# Patient Record
Sex: MALE | Race: WHITE | NOT HISPANIC OR LATINO
[De-identification: names, ages, dates, MRNs, and addresses within clinical notes are randomized per-mention and may not be internally consistent; named-entity substitution may affect disease eponyms.]

---

## 2019-03-11 PROBLEM — Z00.00 ENCOUNTER FOR PREVENTIVE HEALTH EXAMINATION: Status: ACTIVE | Noted: 2019-03-11

## 2019-03-15 ENCOUNTER — RECORD ABSTRACTING (OUTPATIENT)
Age: 54
End: 2019-03-15

## 2019-03-15 DIAGNOSIS — Z82.49 FAMILY HISTORY OF ISCHEMIC HEART DISEASE AND OTHER DISEASES OF THE CIRCULATORY SYSTEM: ICD-10-CM

## 2019-03-15 DIAGNOSIS — M79.9 SOFT TISSUE DISORDER, UNSPECIFIED: ICD-10-CM

## 2019-03-15 DIAGNOSIS — D35.02 BENIGN NEOPLASM OF LEFT ADRENAL GLAND: ICD-10-CM

## 2019-03-15 DIAGNOSIS — Z85.850 PERSONAL HISTORY OF MALIGNANT NEOPLASM OF THYROID: ICD-10-CM

## 2019-03-15 DIAGNOSIS — Z78.9 OTHER SPECIFIED HEALTH STATUS: ICD-10-CM

## 2019-03-15 DIAGNOSIS — Z98.890 OTHER SPECIFIED POSTPROCEDURAL STATES: ICD-10-CM

## 2019-03-15 DIAGNOSIS — M43.6 TORTICOLLIS: ICD-10-CM

## 2019-03-15 DIAGNOSIS — Z80.0 FAMILY HISTORY OF MALIGNANT NEOPLASM OF DIGESTIVE ORGANS: ICD-10-CM

## 2019-03-15 RX ORDER — OMEGA-3S/DHA/EPA/FISH OIL 980-1400MG
CAPSULE,DELAYED RELEASE (ENTERIC COATED) ORAL
Refills: 0 | Status: ACTIVE | COMMUNITY

## 2019-03-15 RX ORDER — LEVOTHYROXINE SODIUM 0.12 MG/1
125 TABLET ORAL DAILY
Refills: 0 | Status: ACTIVE | COMMUNITY

## 2019-03-15 RX ORDER — CHLORHEXIDINE GLUCONATE 4 %
LIQUID (ML) TOPICAL
Refills: 0 | Status: ACTIVE | COMMUNITY

## 2019-04-01 ENCOUNTER — APPOINTMENT (OUTPATIENT)
Dept: OTOLARYNGOLOGY | Facility: CLINIC | Age: 54
End: 2019-04-01
Payer: COMMERCIAL

## 2019-04-01 DIAGNOSIS — Z86.39 PERSONAL HISTORY OF OTHER ENDOCRINE, NUTRITIONAL AND METABOLIC DISEASE: ICD-10-CM

## 2019-04-01 PROCEDURE — 99213 OFFICE O/P EST LOW 20 MIN: CPT | Mod: 25

## 2019-04-01 PROCEDURE — 31575 DIAGNOSTIC LARYNGOSCOPY: CPT

## 2019-04-01 NOTE — CONSULT LETTER
[Dear  ___] : Dear  [unfilled], [Consult Letter:] : I had the pleasure of evaluating your patient, [unfilled]. [Please see my note below.] : Please see my note below. [FreeTextEntry3] : Josh Braga MD [DrMary  ___] : Dr. ARGUETA

## 2019-04-01 NOTE — ASSESSMENT
[FreeTextEntry1] : Clinically is quite stable without any evidence of disease. He is followed an endocrinology care by Dr. Eduardo Patel.\par His airway is fine.\par His throat symptoms are likely from inflammation from the radioactive iodine. I would have expected him to have improved by this point. He does have a known history of GERD. I will empirically treat him with omeprazole 40 mg for LP are and semen follow up in 6 weeks time or as needed. I did reassure him that everything otherwise looked quite well

## 2019-04-01 NOTE — PROCEDURE
[de-identified] : PROCEDURE: Flexible laryngoscopy\par SURGEON: Dr. Braga\par INDICATIONS: He was unable to tolerate a mirror exam. Assess for laryngopharynx pharyngeal reflux. cough. head and neck mass. \par ANESTHESIA: The patient was placed in a sitting position.  Following application of the topical anesthetic and decongestant, exam was performed with a flexible scope.  The scope was passed along the right nasal floor to the nasopharynx.  It was then passed into the region of the middle meatus, middle turbinate, and sphenoethmoid region.  An identical procedure was performed on the left side.  The following findings were noted:\par \par The nasal musoca was healthy appearing and the septum was roughly midline. The middle meatus and sphenoethmoid recesses were clear bilaterally. The nasopharynx \par \par Nasopharynx: no masses, choanae patent, no adenoid tissue\par \par Base of tongue/vallecula: no masses or asymmetry\par Pharyngeal walls: symmetrical. No masses\par Pyriform sinuses: no lesions or pooling of secretions\par Epiglottis: normal. No edema or lesions\par Aryepiglottic folds: normal. No lesions. \par Vocal cords: clear and mobile. No lesions. Airway patent\par Arytenoids: no edema or erythema \par Interarytenoid area: no edema, erythema or lesion.\par \par

## 2019-04-01 NOTE — HISTORY OF PRESENT ILLNESS
[de-identified] : This gentleman is very well known to me. He is now approximately 5 months status post total thyroidectomy and left lateral neck dissection for stage I papillary thyroid cancer. He underwent radioactive iodine ablation in mid February. Also at my suggestion he has been getting physical therapy for some of the neck musculature.\par \par He reports to me that since the radioactive iodine he has had a feeling of tightness in his throat. He has slight intermittent dysphagia. He also notes slight change in voice since the iodine. He does not complain of unexplained weight loss or hemoptysis. He breathes comfortably.

## 2019-04-02 ENCOUNTER — TRANSCRIPTION ENCOUNTER (OUTPATIENT)
Age: 54
End: 2019-04-02

## 2019-05-02 ENCOUNTER — APPOINTMENT (OUTPATIENT)
Dept: OTOLARYNGOLOGY | Facility: CLINIC | Age: 54
End: 2019-05-02
Payer: COMMERCIAL

## 2019-05-02 DIAGNOSIS — K21.9 GASTRO-ESOPHAGEAL REFLUX DISEASE W/OUT ESOPHAGITIS: ICD-10-CM

## 2019-05-02 PROCEDURE — 99213 OFFICE O/P EST LOW 20 MIN: CPT | Mod: 25

## 2019-05-02 PROCEDURE — 31575 DIAGNOSTIC LARYNGOSCOPY: CPT

## 2019-05-02 RX ORDER — METOCLOPRAMIDE 5 MG/1
5 TABLET ORAL
Refills: 0 | Status: DISCONTINUED | COMMUNITY
End: 2019-05-02

## 2019-05-02 RX ORDER — LEVOTHYROXINE SODIUM 0.17 MG/1
TABLET ORAL
Refills: 0 | Status: DISCONTINUED | COMMUNITY
End: 2019-05-02

## 2019-05-02 RX ORDER — ALPRAZOLAM 1 MG/1
1 TABLET ORAL
Refills: 0 | Status: DISCONTINUED | COMMUNITY
End: 2019-05-02

## 2019-05-02 NOTE — ASSESSMENT
[FreeTextEntry1] : Clinically improving. I've suggested she take his dose of omeprazole before lunch not before dinner. He will also take Zantac 300 mg once or twice a day.\par \par I have emphasized importance of diet and lifestyle modifications.\par I have asked him to followup with me in 2 months time as needed. I have reassured him at this time there is no evidence of disease recurrence of tumor.

## 2019-05-02 NOTE — HISTORY OF PRESENT ILLNESS
[de-identified] : This gentleman is very well known to me. He is now approximately 5 months status post total thyroidectomy and left lateral neck dissection for stage I papillary thyroid cancer. He underwent radioactive iodine ablation in mid February. Also at my suggestion he has been getting physical therapy for some of the neck musculature.\par \par He reports to me that since the radioactive iodine he has had a feeling of tightness in his throat. He has slight intermittent dysphagia. He also notes slight change in voice since the iodine. He does not complain of unexplained weight loss or hemoptysis. He breathes comfortably. [FreeTextEntry1] : This gentleman is very well known to me. He is 6 much status post surgical treatment for region the metastatic papillary thyroid cancer.\par He did receive radioactive iodine ablation.\par \par He is also getting physical therapy for some neck discomfort.\par \par He had some throat pain symptoms. I felt that it was likely related to LP R. as he had a known history of GERD.\par I treated him with once a day omeprazole.\par He reports that on some days he is feeling better and others he is not.

## 2019-05-02 NOTE — CONSULT LETTER
[Dear  ___] : Dear  [unfilled], [Sincerely,] : Sincerely, [Courtesy Letter:] : I had the pleasure of seeing your patient, [unfilled], in my office today. [Please see my note below.] : Please see my note below. [FreeTextEntry3] : Josh Braga MD

## 2019-05-02 NOTE — PROCEDURE
[de-identified] : PROCEDURE: Flexible laryngoscopy\par SURGEON: Dr. Braga\par INDICATIONS: He was unable to tolerate a mirror exam. Assess for laryngopharynx pharyngeal reflux. cough. head and neck mass. \par ANESTHESIA: The patient was placed in a sitting position.  Following application of the topical anesthetic and decongestant, exam was performed with a flexible scope.  The scope was passed along the right nasal floor to the nasopharynx.  It was then passed into the region of the middle meatus, middle turbinate, and sphenoethmoid region.  An identical procedure was performed on the left side.  The following findings were noted:\par \par The nasal musoca was healthy appearing and the septum was roughly midline. The middle meatus and sphenoethmoid recesses were clear bilaterally. The nasopharynx \par \par Nasopharynx: no masses, choanae patent, no adenoid tissue\par \par Base of tongue/vallecula: no masses or asymmetry\par Pharyngeal walls: symmetrical. No masses\par Pyriform sinuses: no lesions or pooling of secretions\par Epiglottis: normal. No edema or lesions\par Aryepiglottic folds: normal. No lesions. \par Vocal cords: clear and mobile. No lesions. Airway patent\par Arytenoids: no edema or erythema \par Interarytenoid area: no edema, erythema or lesion.\par \par

## 2019-05-31 ENCOUNTER — APPOINTMENT (OUTPATIENT)
Dept: OTOLARYNGOLOGY | Facility: CLINIC | Age: 54
End: 2019-05-31

## 2019-07-09 ENCOUNTER — APPOINTMENT (OUTPATIENT)
Dept: OTOLARYNGOLOGY | Facility: CLINIC | Age: 54
End: 2019-07-09
Payer: COMMERCIAL

## 2019-07-09 DIAGNOSIS — M62.89 OTHER SPECIFIED DISORDERS OF MUSCLE: ICD-10-CM

## 2019-07-09 PROCEDURE — 99213 OFFICE O/P EST LOW 20 MIN: CPT | Mod: 25

## 2019-07-09 PROCEDURE — 31575 DIAGNOSTIC LARYNGOSCOPY: CPT

## 2019-07-09 RX ORDER — OMEPRAZOLE 40 MG/1
40 CAPSULE, DELAYED RELEASE ORAL DAILY
Qty: 30 | Refills: 1 | Status: DISCONTINUED | COMMUNITY
Start: 2019-04-01 | End: 2019-07-09

## 2019-07-09 NOTE — PROCEDURE
[de-identified] : PROCEDURE: Flexible laryngoscopy\par SURGEON: Dr. Braga\par INDICATIONS: He was unable to tolerate a mirror exam. Assess for laryngopharynx pharyngeal reflux. cough. head and neck mass. \par ANESTHESIA: The patient was placed in a sitting position.  Following application of the topical anesthetic and decongestant, exam was performed with a flexible scope.  The scope was passed along the right nasal floor to the nasopharynx.  It was then passed into the region of the middle meatus, middle turbinate, and sphenoethmoid region.  An identical procedure was performed on the left side.  The following findings were noted:\par \par The nasal musoca was healthy appearing and the septum was roughly midline. The middle meatus and sphenoethmoid recesses were clear bilaterally. The nasopharynx \par \par Nasopharynx: no masses, choanae patent, no adenoid tissue\par \par Base of tongue/vallecula: no masses or asymmetry\par Pharyngeal walls: symmetrical. No masses\par Pyriform sinuses: no lesions or pooling of secretions\par Epiglottis: normal. No edema or lesions\par Aryepiglottic folds: normal. No lesions. \par Vocal cords: clear and mobile. No lesions. Airway patent\par Arytenoids: no edema or erythema \par Interarytenoid area: no edema, erythema or lesion.\par

## 2019-07-09 NOTE — HISTORY OF PRESENT ILLNESS
[de-identified] : Now 8 months s/p total thyroidectomy, neck dissection and GUERRERO.\par Initially staged as Stag 4, but revised with the new AJCC staging system- to stage 1.\par \par He complains of some musculoskeletal symptoms.  He has taken a break from PT.

## 2019-07-09 NOTE — ASSESSMENT
[FreeTextEntry1] : Stable with ZO.\par continued fu with Dr. Patel.\par fu with PT.\par fu with me in 3 months.

## 2019-10-08 ENCOUNTER — APPOINTMENT (OUTPATIENT)
Dept: OTOLARYNGOLOGY | Facility: CLINIC | Age: 54
End: 2019-10-08
Payer: COMMERCIAL

## 2019-10-08 PROCEDURE — 99213 OFFICE O/P EST LOW 20 MIN: CPT

## 2019-10-09 NOTE — ASSESSMENT
[FreeTextEntry1] : He is doing well.\par It is common to see TG levels and nodes regress for at times a few years after surgery in the setting of extensive adenopathy at surgery.  Not clear if represents eradicated disease.\par No distant spread.\par \par will continue to follow. No indication for biopsy or surgery at this time.\par \par fu in 6 monts.\par he is following up at [facility] for his 6 month surveillance in February.\par

## 2019-10-09 NOTE — HISTORY OF PRESENT ILLNESS
[de-identified] : Clinically stable.\par He reports improvement of his neck stiffness.\par November he will be one year out from surgery.\par \par He has close fu at [facility] at my suggestion.\par Recent serologic and sonogram are not concerning.\par There is some apparent adenopathy, but it is equivocal for regional recurrence.\par \par He has no real complaints.

## 2020-06-22 ENCOUNTER — APPOINTMENT (OUTPATIENT)
Dept: OTOLARYNGOLOGY | Facility: CLINIC | Age: 55
End: 2020-06-22
Payer: COMMERCIAL

## 2020-06-22 PROCEDURE — 99213 OFFICE O/P EST LOW 20 MIN: CPT

## 2020-07-06 NOTE — HISTORY OF PRESENT ILLNESS
[de-identified] : He seeing me in followup today.\par He is approximately 1-1/2 years status post total thyroidectomy with bilateral neck dissection.\par He is follow closely at [facility] in endocrinology care.\par Ultrasound demonstrates what appeared to be 2 small lymph nodes in his left central compartment. They are stable in size from an ultrasound in August of 2019 with a followup ultrasound in March of 2020.\par \par Clinically he feels very well.

## 2020-07-06 NOTE — ASSESSMENT
[FreeTextEntry1] : He is stable.\par He has stable blood partially suspicious central compartment lymph nodes on the left.\par Both me in endocrinology service will follow him clinically.\par He is going to be scheduled for CAT scan and ultrasound in 6 months.\par \par He understands that lymph node recurrence is very common with his level of presenting disease.However this does not change his prognosis. He is clinically stable and I do not see a role for additional surgery at this time.

## 2020-10-15 ENCOUNTER — APPOINTMENT (OUTPATIENT)
Dept: OTOLARYNGOLOGY | Facility: CLINIC | Age: 55
End: 2020-10-15
Payer: COMMERCIAL

## 2020-10-15 VITALS — TEMPERATURE: 97.7 F | WEIGHT: 167 LBS | BODY MASS INDEX: 24.73 KG/M2 | HEIGHT: 69 IN

## 2020-10-15 DIAGNOSIS — C73 MALIGNANT NEOPLASM OF THYROID GLAND: ICD-10-CM

## 2020-10-15 PROCEDURE — 99213 OFFICE O/P EST LOW 20 MIN: CPT | Mod: 25

## 2020-10-15 PROCEDURE — 31575 DIAGNOSTIC LARYNGOSCOPY: CPT

## 2020-10-15 RX ORDER — ESCITALOPRAM OXALATE 5 MG/1
TABLET, FILM COATED ORAL
Refills: 0 | Status: ACTIVE | COMMUNITY

## 2020-10-20 PROBLEM — C73 PAPILLARY THYROID CARCINOMA: Status: ACTIVE | Noted: 2019-03-15

## 2020-10-20 NOTE — PROCEDURE
[de-identified] : PROCEDURE: Flexible laryngoscopy\par SURGEON: Dr. Braga\par INDICATIONS: He was unable to tolerate a mirror exam. Assess for laryngopharynx pharyngeal reflux. cough. head and neck mass. \par ANESTHESIA: The patient was placed in a sitting position.  Following application of the topical anesthetic and decongestant, exam was performed with a flexible scope.  The scope was passed along the right nasal floor to the nasopharynx.  It was then passed into the region of the middle meatus, middle turbinate, and sphenoethmoid region.  An identical procedure was performed on the left side.  The following findings were noted:\par \par The nasal musoca was healthy appearing and the septum was roughly midline. The middle meatus and sphenoethmoid recesses were clear bilaterally. The nasopharynx \par \par Nasopharynx: no masses, choanae patent, no adenoid tissue\par \par Base of tongue/vallecula: no masses or asymmetry\par Pharyngeal walls: symmetrical. No masses.\par Pyriform sinuses: no lesions or pooling of secretions.\par Epiglottis: normal. No edema or lesions.\par Aryepiglottic folds: normal. No lesions. \par Vocal cords: clear and mobile. No lesions. Airway patent.\par Arytenoids: no edema or erythema. \par Interarytenoid area: no edema, erythema or lesion.\par

## 2020-10-20 NOTE — ASSESSMENT
[FreeTextEntry1] : Is clinically stable at this time.\par By imaging and blood workhe likely has residual low-grade low-volume central neck disease.This is very common with regionally metastatic thyroid cancer.\par I agree with Dr. Patel's recommendation of continued observation.\par He will be continued to be followed by sonogram, blood work and CT imaging in the next 6-12 months.\par Follow up with me in one year.

## 2020-10-20 NOTE — HISTORY OF PRESENT ILLNESS
[de-identified] : He is very well known to me.\par He is now nearly 2 years status post total thyroidectomy, neck dissection with postoperative radioactive iodine ablation.\par He is followed very closely at [facility] my suggestion by Dr. Jacob Patel.\par He is without complaints today.

## 2021-02-09 ENCOUNTER — NON-APPOINTMENT (OUTPATIENT)
Age: 56
End: 2021-02-09

## 2021-04-15 ENCOUNTER — APPOINTMENT (OUTPATIENT)
Dept: OTOLARYNGOLOGY | Facility: CLINIC | Age: 56
End: 2021-04-15
Payer: COMMERCIAL

## 2021-04-15 VITALS — BODY MASS INDEX: 24.73 KG/M2 | TEMPERATURE: 97.4 F | WEIGHT: 167 LBS | HEIGHT: 69 IN

## 2021-04-15 DIAGNOSIS — R07.0 PAIN IN THROAT: ICD-10-CM

## 2021-04-15 DIAGNOSIS — C73 MALIGNANT NEOPLASM OF THYROID GLAND: ICD-10-CM

## 2021-04-15 PROCEDURE — 31575 DIAGNOSTIC LARYNGOSCOPY: CPT

## 2021-04-15 PROCEDURE — 99072 ADDL SUPL MATRL&STAF TM PHE: CPT

## 2021-04-15 PROCEDURE — 99213 OFFICE O/P EST LOW 20 MIN: CPT | Mod: 25

## 2021-04-16 PROBLEM — R07.0 THROAT PAIN: Status: ACTIVE | Noted: 2019-07-09

## 2021-04-16 PROBLEM — C73 PAPILLARY THYROID CARCINOMA: Status: ACTIVE | Noted: 2019-05-02

## 2021-04-16 NOTE — HISTORY OF PRESENT ILLNESS
[de-identified] : Gentleman seen in followup today. He is well-known to me.She underwent total thyroidectomy and left neck dissection November of 2018. He had a stage I papillary thyroid adenocarcinoma. Dr. Patel  at [facility] is following him as well.\par He complains of intermittent slight throat discomfort when he changes her head positions. He tolerates a full and normal diet.\par \par He has a thyroglobulin level that is around one.\par There is a questionable lymph node in level 4 of his left neck and has not been biopsied and has at times looked smaller on ultrasonic evaluation. It is approximately 1 cm greatest diameter. He is scheduled for a CAT scan of his neck and follow up serologies ordered by Dr. Patel  to be done in 8 or 9 months.

## 2021-04-16 NOTE — PROCEDURE
[de-identified] : PROCEDURE: Flexible laryngoscopy\par SURGEON: Dr. Braga\par INDICATIONS: He was unable to tolerate a mirror exam. Assess for laryngopharynx pharyngeal reflux. cough. head and neck mass. \par ANESTHESIA: The patient was placed in a sitting position.  Following application of the topical anesthetic and decongestant, exam was performed with a flexible scope.  The scope was passed along the right nasal floor to the nasopharynx.  It was then passed into the region of the middle meatus, middle turbinate, and sphenoethmoid region.  An identical procedure was performed on the left side.  The following findings were noted:\par \par The nasal musoca was healthy appearing and the septum was roughly midline. The middle meatus and sphenoethmoid recesses were clear bilaterally. The nasopharynx \par \par Nasopharynx: no masses, choanae patent, no adenoid tissue\par \par Base of tongue/vallecula: no masses or asymmetry\par Pharyngeal walls: symmetrical. No masses.\par Pyriform sinuses: no lesions or pooling of secretions.\par Epiglottis: normal. No edema or lesions.\par Aryepiglottic folds: normal. No lesions. \par Vocal cords: clear and mobile. No lesions. Airway patent.\par Arytenoids: no edema or erythema. \par Interarytenoid area: no edema, erythema or lesion.\par

## 2021-04-16 NOTE — ASSESSMENT
[FreeTextEntry1] : Clinically stable.\par There is a questionable potentially positive lymph node in level for his right neck. He will have this further evaluated in 9 months. His thyroglobulin level is stable.\par We had a long discussion about regional recurrence of lymph nodes and the fact that it does not change long-term prognosis however in some settings it may require additional care.\par \par I will see him in followup after he sees Dr. Patel and has the CT scan and serologies..

## 2022-02-03 ENCOUNTER — APPOINTMENT (OUTPATIENT)
Dept: OTOLARYNGOLOGY | Facility: CLINIC | Age: 57
End: 2022-02-03
Payer: COMMERCIAL

## 2022-02-03 VITALS — HEIGHT: 69 IN | WEIGHT: 178 LBS | BODY MASS INDEX: 26.36 KG/M2 | TEMPERATURE: 94.9 F

## 2022-02-03 PROCEDURE — 99214 OFFICE O/P EST MOD 30 MIN: CPT

## 2022-02-03 RX ORDER — CALCITRIOL 0.5 UG/1
0.5 CAPSULE, LIQUID FILLED ORAL
Refills: 0 | Status: DISCONTINUED | COMMUNITY
End: 2022-02-03

## 2022-02-09 NOTE — HISTORY OF PRESENT ILLNESS
[de-identified] : Very well known to me.\par He underwent total thyroidectomy and left modified and central neck dissection in 11/18, that I performed.\par He is followed closely by me and Dr. Patel in endocrinology care at [facility] [place].\par He has not complaints today.  He previously as expected had some musculoskeletal complaints, that have resolved.\par

## 2022-02-09 NOTE — CONSULT LETTER
[Dear  ___] : Dear  [unfilled], [Courtesy Letter:] : I had the pleasure of seeing your patient, [unfilled], in my office today. [Please see my note below.] : Please see my note below. [Sincerely,] : Sincerely, [FreeTextEntry3] : Josh Braga MD\par [facility]- Co-Director\par [facility]

## 2022-02-09 NOTE — ASSESSMENT
[FreeTextEntry1] : Clinically done very well.\par His unstimulated Tg levels are stable and declining.\par His recent ct imaging which I reviewed does not show recurrent disease.  The findings that are present, likely relate to structurally treated disease.\par He is being appropriately suppressed for TSH.\par The remainder of his recent thyroid indices look great.\par \par fu 9-12 months.\par continue to follow with endocrinology.

## 2022-10-15 NOTE — CONSULT LETTER
Prescription approved per [facility] Refill Protocol.  Cecil Burgess RN     [Dear  ___] : Dear  [unfilled], [Courtesy Letter:] : I had the pleasure of seeing your patient, [unfilled], in my office today. [Please see my note below.] : Please see my note below. [Sincerely,] : Sincerely, [FreeTextEntry3] : Josh Braga MD\par [facility]- Co-Director\par [facility]  5 months

## 2022-12-16 ENCOUNTER — APPOINTMENT (OUTPATIENT)
Dept: OTOLARYNGOLOGY | Facility: CLINIC | Age: 57
End: 2022-12-16

## 2022-12-16 VITALS — HEIGHT: 69 IN | BODY MASS INDEX: 26.66 KG/M2 | WEIGHT: 180 LBS | TEMPERATURE: 97.4 F

## 2022-12-16 DIAGNOSIS — C73 MALIGNANT NEOPLASM OF THYROID GLAND: ICD-10-CM

## 2022-12-16 PROCEDURE — 31575 DIAGNOSTIC LARYNGOSCOPY: CPT

## 2022-12-16 PROCEDURE — 99213 OFFICE O/P EST LOW 20 MIN: CPT | Mod: 25

## 2022-12-16 NOTE — ASSESSMENT
[FreeTextEntry1] : Clinically stable.\par There is no evidence of disease.\par He will continue to follow in endocrinology care.\par Continue with suppression therapy.\par Follow-up in 1 year.

## 2022-12-16 NOTE — PROCEDURE
[de-identified] : PROCEDURE: Flexible laryngoscopy\par SURGEON: Dr. Braga\par INDICATIONS: He was unable to tolerate a mirror exam. Assess for laryngopharynx pharyngeal reflux. cough. head and neck mass. \par ANESTHESIA: The patient was placed in a sitting position.  Following application of the topical anesthetic and decongestant, exam was performed with a flexible scope.  The scope was passed along the right nasal floor to the nasopharynx.  It was then passed into the region of the middle meatus, middle turbinate, and sphenoethmoid region.  An identical procedure was performed on the left side.  The following findings were noted:\par \par The nasal musoca was healthy appearing and the septum was roughly midline. The middle meatus and sphenoethmoid recesses were clear bilaterally. The nasopharynx \par \par Nasopharynx: no masses, choanae patent, no adenoid tissue\par \par Base of tongue/vallecula: no masses or asymmetry\par Pharyngeal walls: symmetrical. No masses.\par Pyriform sinuses: no lesions or pooling of secretions.\par Epiglottis: normal. No edema or lesions.\par Aryepiglottic folds: normal. No lesions. \par Vocal cords: clear and mobile. No lesions. Airway patent.\par Arytenoids: no edema or erythema. \par Interarytenoid area: no edema, erythema or lesion.\par

## 2023-04-17 NOTE — HISTORY OF PRESENT ILLNESS
[de-identified] : Gentleman very well-known to me.  He is now over 4 years status post surgical treatment with radioactive iodine ablation for regionally metastatic papillary thyroid cancer.\par Over the years he is demonstrated a persistent thyroglobulin level however that is dropping.\par Imaging has never been able to identify any structural disease.\par He is followed closely in endocrinology care by Dr. Patel at [facility].\par He is without any complaints today. [FreeTextEntry2] : PT IS BEING SEEN FOR A FOLLOW-UP VIRTUALLY PAIN MANAGEMENT VISIT FOR  MED REFILLED

## 2024-01-16 ENCOUNTER — APPOINTMENT (OUTPATIENT)
Dept: OTOLARYNGOLOGY | Facility: CLINIC | Age: 59
End: 2024-01-16
Payer: COMMERCIAL

## 2024-01-16 DIAGNOSIS — C73 MALIGNANT NEOPLASM OF THYROID GLAND: ICD-10-CM

## 2024-01-16 DIAGNOSIS — R09.A2 FOREIGN BODY SENSATION, THROAT: ICD-10-CM

## 2024-01-16 PROCEDURE — 31575 DIAGNOSTIC LARYNGOSCOPY: CPT

## 2024-01-16 PROCEDURE — 99213 OFFICE O/P EST LOW 20 MIN: CPT | Mod: 25

## 2024-01-19 PROBLEM — R09.A2 GLOBUS PHARYNGEUS: Status: ACTIVE | Noted: 2019-04-01

## 2024-01-19 PROBLEM — C73 THYROID CANCER: Status: ACTIVE | Noted: 2019-03-15

## 2024-01-19 NOTE — HISTORY OF PRESENT ILLNESS
[de-identified] : Now over 5 years status post total thyroidectomy and left neck dissection for stage I papillary thyroid cancer. He is without any complaints at this time. He is followed very closely at [facility] for both surveillance and suppression therapy. He is without any real complaints today.

## 2024-01-19 NOTE — ASSESSMENT
[FreeTextEntry1] : No evidence of disease. I spent a great deal time reviewing his recent endocrinology workup including labs and imaging. Follow-up in 1 to 2 years.

## 2024-01-19 NOTE — PROCEDURE
[de-identified] : PROCEDURE: Flexible laryngoscopy SURGEON: Dr. Braga INDICATIONS: He was unable to tolerate a mirror exam. Assess for laryngopharynx pharyngeal reflux. cough. head and neck mass.  ANESTHESIA: The patient was placed in a sitting position.  Following application of the topical anesthetic and decongestant, exam was performed with a flexible scope.  The scope was passed along the right nasal floor to the nasopharynx.  It was then passed into the region of the middle meatus, middle turbinate, and sphenoethmoid region.  An identical procedure was performed on the left side.  The following findings were noted:  The nasal musoca was healthy appearing and the septum was roughly midline. The middle meatus and sphenoethmoid recesses were clear bilaterally. The nasopharynx   Nasopharynx: no masses, choanae patent, no adenoid tissue  Base of tongue/vallecula: no masses or asymmetry Pharyngeal walls: symmetrical. No masses. Pyriform sinuses: no lesions or pooling of secretions. Epiglottis: normal. No edema or lesions. Aryepiglottic folds: normal. No lesions.  Vocal cords: clear and mobile. No lesions. Airway patent. Arytenoids: no edema or erythema.  Interarytenoid area: no edema, erythema or lesion.

## 2025-07-07 ENCOUNTER — NON-APPOINTMENT (OUTPATIENT)
Age: 60
End: 2025-07-07

## 2025-07-07 ENCOUNTER — APPOINTMENT (OUTPATIENT)
Dept: OTOLARYNGOLOGY | Facility: CLINIC | Age: 60
End: 2025-07-07
Payer: COMMERCIAL

## 2025-07-07 PROCEDURE — 99213 OFFICE O/P EST LOW 20 MIN: CPT | Mod: 25

## 2025-07-07 PROCEDURE — 31575 DIAGNOSTIC LARYNGOSCOPY: CPT
